# Patient Record
(demographics unavailable — no encounter records)

---

## 2024-10-14 NOTE — HISTORY OF PRESENT ILLNESS
[Patient reported bone density results were normal] : Patient reported bone density results were normal [Patient reported colonoscopy was normal] : Patient reported colonoscopy was normal [FreeTextEntry1] : 76-year-old postmenopausal presents for routine annual exam.  Patient underwent a procedure over the summer for endometrial polyp removal, she has not had any complications since or recurrence of symptoms patient has an appointment with Dr. Flaherty for her annual exam.  She usually gets her mammos request from Dr. Flaherty.  Patient states she has lost 27 pounds on Rybelsus so far.  Postmenopausal [Mammogramdate] : 1/2024 [TextBox_19] : BI-RADS 2 [BreastSonogramDate] : 1/2024 [TextBox_25] : BI-RADS 2 [PapSmeardate] : 1/2019 [TextBox_31] : NILM, HPV neg [BoneDensityDate] : 2014 [ColonoscopyDate] : 11/2013 [TextBox_43] : Needs follow-up

## 2024-10-14 NOTE — PHYSICAL EXAM
[Chaperone Present] : A chaperone was present in the examining room during all aspects of the physical examination [[id number]] : A chaperone was present during the pelvic exam. [Appropriately responsive] : appropriately responsive [Alert] : alert [No Acute Distress] : no acute distress [No Lymphadenopathy] : no lymphadenopathy [Soft] : soft [Non-tender] : non-tender [Non-distended] : non-distended [No HSM] : No HSM [No Lesions] : no lesions [No Mass] : no mass [Oriented x3] : oriented x3 [FreeTextEntry2] : Overweight [Examination Of The Breasts] : a normal appearance [No Masses] : no breast masses were palpable [Labia Majora] : normal [Labia Minora] : normal [Normal] : normal [Uterine Adnexae] : normal [FreeTextEntry5] : Pap today

## 2024-10-14 NOTE — PLAN
[FreeTextEntry1] :  Routine annual exam Breast and pelvic exam Pap smear today Referral for bone density Follow-up in 1 year or as needed Will call with Pap results

## 2024-10-25 NOTE — ASSESSMENT
[FreeTextEntry1] : dyspepsia/nausea - liekly related to rybelsus, but she is extremely concerned RE: panc ca and FHx .  Will get MRI. Trial ppi prn otc.  Colon cancer screening - colonoscopy due - Colonoscopy scheduled - Risks, benefits, alternatives were discussed, including but not limited to bleeding, infection, perforation and sedation risks. Additionally, the possibility of missed lesions was conveyed.  obesity - pt loss - hold rybelsus for procedure  -Constipation - reviewed dietary and lifestyle modifications, including increased fluid, fiber intake, as well as habituation / following urge to defecate, cutting back on bread/pasta, and to consider starting fiber supplement (eg.,  psyllium)  PMD/consultation/hospital notes and Labs/imaging/prior endoscopic results reviewed to extent noted in HPI; and, if procedure code billed on this visit for lab draw, this serves to signify that labs were drawn here in this office.

## 2024-10-25 NOTE — HISTORY OF PRESENT ILLNESS
[FreeTextEntry1] : 76f obesity, HLD< HTN, HITESH, mild AS, pericardial window '14, presenting for colon cancer screening. Pt denies abdominal pain, rectal bleeding, change in bowel habits, or unexplained weight loss.    Notes some nausea since starting rybelsus but is very concerned RE: panc ca as this mimics what her father had aat same age. GM w/ same.    + chronic constipation which she manages w/diet.  Last colonoscopy: 6 y ago w/ dr. Harrell - polyps - told 5y f/u 10/2024 cbc, cmet unrevealing  Soc:  no tobacco or significant EtOH FHx: F- panc ca  ROS: Constitutional:: no weight loss, fevers ENT: no deafness Eyes: not blind Neck: no LN Chest: no dyspnea/cough Cardiac: no chest pain Vascular: no leg swelling GI: no abdominal pain, nausea, vomiting, diarrhea, constipation, rectal bleeding, dysphagia, melena unless otherwise noted in HPI : no dysuria, dark urine Skin: no rashes, jaundice Heme: no bleeding Endocrine: no DM unless otherwise stated in HPI  Px: (VS noted below) General: NAD, obese Eyes: anicteric Oropharynx:  clear Neck: no LN Chest: normal respiratory effort CVS: regular Abd: soft, NT, ND, +BS, no HSM Ext: no atrophy Neuro: grossly nonfocal  Labs/imaging/prior endoscopic results reviewed to the extent available and noted in HPI

## 2024-10-25 NOTE — REASON FOR VISIT
[Consultation] : a consultation visit [FreeTextEntry1] : Kindly asked by DEVON SINGH MD  to consult and evaluate patient for     colon screening                A copy of this note is being sent to physician requesting consultation.

## 2024-11-07 NOTE — HISTORY OF PRESENT ILLNESS
[BoneDensityDate] : 10/24 [TextBox_37] : Dr drake [Mammogramdate] : 11/12/24 [FreeTextEntry1] : 01/24 [FreeTextEntry2] : dr chris- body check [FreeTextEntry3] : 01/12/24 [FreeTextEntry5] : 10/14

## 2025-03-03 NOTE — PHYSICAL EXAM
[Obese, well nourished, in no acute distress] : obese, well nourished, in no acute distress [de-identified] : TEB visit

## 2025-03-03 NOTE — ASSESSMENT
[FreeTextEntry1] : 77F PMH class III obesity, HTN, elevated trig, low HDL, HLD, prediabetes, HITESH (not tolerating CPAP), GERD, arthritis, asthma, uterine polyp, who presents to weight management for follow-up.  # Metabolic Syndrome (5 of 5 criteria- class III obesity - central, HTN, trigs, preDM, low HDL) c/b HLD, HITESH (not tolerating CPAP), GERD, arthritis: Weight today 250.5 lbs, BMI 45.82, BF 51.9%, down 2 lbs since last visit despite being off medication for a month, having gained 10 lbs, and lost it. However, she restarted at full 14 mg/d and has been experiencing nausea, she only ever really tolerated the Rybelsus with slow uptitration and stable dosing. I suspect she may have lost a bit more had she continued over that month, but may be close to her plateau with this treatment. We have discussed dietary adjustments and I am happy that she is seeing dietician this month as we still have some work to do in terms of planning her meals and to avoid binge-behavior and snacking, and reduce sugary intake. We have discussed the importance of EATING healthy food prior to falling into these eating patterns. Has HITESH, doesn't tolerate CPAP. Given her overall poor toleration of Rybelsus and insufficient weight loss, we will discontinue and start Zepbound  - Food log - Meal planning/prep - Discussed starting steel-cut oatmeal for breakfast, maybe sweetened with fruit - Prep lunches, or plan, discussed meal delivery service - Strategies for cooking for 1 (ie batches, etc) - Aim for 1 serving of vegetable per day.  - Reduce accessibility in house of sweets / snack food, increase availability of fresh fruit/veg - Start out day with solid food (ie not just hot chocolate, coffee etc), replace OJ w/ an orange - Dietician referral, re-establish - Restart basic physical activity - D/c Rybelsus (if not covered for Zepbound, likely dose qOd).  - Start Zepbound 2.5 mg/wk, prescribed to treat obstructive sleep apnea. - Will hold off adding topiramate for now, focus on diet, and other medical adjustments as above - F/u in 2-3 month     ...Z...papM papRy... met/top/cont

## 2025-03-03 NOTE — HISTORY OF PRESENT ILLNESS
[FreeTextEntry1] : 77F PMH class III obesity, HTN, elevated trig, low HDL, HLD, prediabetes, HITESH (not tolerating CPAP), GERD, arthritis, asthma, uterine polyp, who presents to weight management for follow-up.  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 She also noted stress eating habits and weight gain associated with stressful periods in her life. Estimating 10 lbs per year gain in recent years. Has engaged in numerous dietary interventions without sustained success. Diet noted for lots of processed foods, sugars, processed meats, desserts and snacks, eats at restaurants semi-frequently, and often getting market-buffet for home meals. Sedentary lifestyle. HITESH, untreated.  We discussed lifestyle and dietary interventions. She was prescribed Rybelsus.   Weight today: 250.5 lbs, BMI 45.82, BF 51.9% Weight at last visit (10/3/24): 252 lbs 4 oz, BMI 46.14, BF  Weight (8/1/24): 255 lbs, BMI 46.64, 51% Weight (6/6/24): 262 lbs 2 oz, BMI 47.94 Weight (4/18/24): 267 lbs, BMI 48.84 Weight at Initial Visit (1/22/24): 279 lbs, BMI 51.03, BF 49.5%.  She went to ED, dx flu and took tamiflu. Had colonoscopy / endoscopy, she held it for a week, she ended up stopping for a month, went up to 260 lbs, but since lost 10 lbs, restarted the 14 mg and has had some nausea.   Medication: As above, back on Rybelsus 14 mg/wk  Diet: Has appt with dietician next week. B: 4 chocolate Belvita crackers, milk, grapes L(2-3 pm): Milk and cookies  Snack: Getting hungry in afternoon. Lean cuisine, or milk and cookies  Exercise: Walking, 2000k steps per day. Hasn't started using stationary bike recently.  Sleep: Has hx HITESH, tried to wear CPAP for a year but couldn't tolerate.  (Interrupted to urinate frequently. Goes to bed ~11 pm, up at 7:30 am, estimates 7-8 hours, sometimes sleeps later. Does snore, has awoken herself

## 2025-04-23 NOTE — PHYSICAL EXAM
[Obese, well nourished, in no acute distress] : obese, well nourished, in no acute distress [de-identified] : TEB visit

## 2025-04-23 NOTE — PHYSICAL EXAM
[Obese, well nourished, in no acute distress] : obese, well nourished, in no acute distress [de-identified] : TEB visit

## 2025-04-23 NOTE — PHYSICAL EXAM
[Obese, well nourished, in no acute distress] : obese, well nourished, in no acute distress [de-identified] : TEB visit

## 2025-04-23 NOTE — ASSESSMENT
[FreeTextEntry1] : 77F PMH class III obesity, HTN, elevated trig, low HDL, HLD, prediabetes, HITESH (not tolerating CPAP), GERD, arthritis, asthma, uterine polyp, who presents to weight management for follow-up.  # Metabolic Syndrome (5 of 5 criteria- class III obesity - central, HTN, trigs, preDM, low HDL) c/b HLD, HITESH (not tolerating CPAP), GERD, arthritis: Weight today 257 lbs, BMI 47.03, regained some weight after stopping Rybelsus 14 mg/d switch to Zepbound 2.5 mg/wk, likely she will need to increase dose to see comparable effectiveness, but I still think Zepbound is likely to have overall higher efficacy if higher dose can be tolerated. Nausea experienced on Rybelsus is completely resolved. Does note more fatigue. She has some occasional light headedness that sounds orthostatic in nature and is intermittent, unclear that it is directly related to medication change, since she has not been losing weight I suspect it is not, but we will monitor and may switch back to Rybelsus if it continues.  - Food log - Meal planning/prep - Discussed starting steel-cut oatmeal for breakfast, maybe sweetened with fruit - Prep lunches, or plan, discussed meal delivery service - Strategies for cooking for 1 (ie batches, etc) - Aim for 1 serving of vegetable per day.  - Reduce accessibility in house of sweets / snack food, increase availability of fresh fruit/veg - Start out day with solid food (ie not just hot chocolate, coffee etc), replace OJ w/ an orange - Dietician referral, re-establish - Restart basic physical activity - Trial increase to Zepbound 5 mg, increase water intake, and will have low threshold to switch back to Rybelsus if light headedness worsens.  - F/u in 2-3 month    papRy... met/top/cont

## 2025-04-23 NOTE — HISTORY OF PRESENT ILLNESS
[FreeTextEntry1] : 77F PMH class III obesity, HTN, elevated trig, low HDL, HLD, prediabetes, HITESH (not tolerating CPAP), GERD, arthritis, asthma, uterine polyp, who presents to weight management for follow-up.  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 She also noted stress eating habits and weight gain associated with stressful periods in her life. Estimating 10 lbs per year gain in recent years. Has engaged in numerous dietary interventions without sustained success. Diet noted for lots of processed foods, sugars, processed meats, desserts and snacks, eats at restaurants semi-frequently, and often getting market-buffet for home meals. Sedentary lifestyle. HITESH, untreated.  We discussed lifestyle and dietary interventions. She was prescribed Rybelsus.   Weight today: 257 lbs, BMI 47.03 Weight at last visit (3/3/25): 250.5 lbs, BMI 45.82, BF 51.9% Weight (10/3/24): 252 lbs 4 oz, BMI 46.14, BF  Weight (8/1/24): 255 lbs, BMI 46.64, 51% Weight (6/6/24): 262 lbs 2 oz, BMI 47.94 Weight (4/18/24): 267 lbs, BMI 48.84 Weight at Initial Visit (1/22/24): 279 lbs, BMI 51.03, BF 49.5%.  Medication: Switched from Rybelsus 14 mg/wk to Zepbound 2.5 mg/wk, notes less nausea, almost none. Has noted dizziness, light headed, when moves head or getting up, feels generalized fatigued.  Diet: Works with dietician. B: 4 chocolate Belvita crackers, milk, grapes L(2-3 pm): Milk and cookies  Snack: Getting hungry in afternoon. Lean cuisine, or milk and cookies. Not drinking much water.   Exercise: Walking, 2000k steps per day. Hasn't started using stationary bike recently.  Sleep: Has hx HITESH, tried to wear CPAP for a year but couldn't tolerate.  (Interrupted to urinate frequently. Goes to bed ~11 pm, up at 7:30 am, estimates 7-8 hours, sometimes sleeps later. Does snore, has awoken herself

## 2025-05-15 NOTE — PHYSICAL EXAM
[Obese, well nourished, in no acute distress] : obese, well nourished, in no acute distress [de-identified] : TEB visit

## 2025-05-15 NOTE — ASSESSMENT
[FreeTextEntry1] : 77F PMH class III obesity, HTN, elevated trig, low HDL, HLD, prediabetes, HITESH (not tolerating CPAP), GERD, arthritis, asthma, uterine polyp, who presents to weight management for follow-up.  # Metabolic Syndrome (5 of 5 criteria- class III obesity - central, HTN, trigs, preDM, low HDL) c/b HLD, HITESH (not tolerating CPAP), GERD, arthritis: Weight today 256 lbs, BMI 46.82, BF 48.9%, down 1 lb since last visit on her 3 doses of 5 mg Zepbound, still up 6 lbs since switching from Rybelsus 14 mg/d, dosing remains low and she's having much less nausea than Rybelsus, light-headedness/fatigue have resolved. Encouraged to have vegetables and legumes and re-establish with dietician. Keep cookies etc out of house or at least in cabinet, replace at table with fresh fruit.  - Food log - Meal planning/prep - Discussed starting steel-cut oatmeal for breakfast, maybe sweetened with fruit - Prep lunches, or plan, discussed meal delivery service - Strategies for cooking for 1 (ie batches, etc) - Aim for 1 serving of vegetable per day.  - Reduce accessibility in house of sweets / snack food, increase availability of fresh fruit/veg - Start out day with solid food (ie not just hot chocolate, coffee etc), replace OJ w/ an orange - Dietician referral, re-establish - Restart basic physical activity - C/w Zepbound 5 mg for a total of 8 weeks, then increase to 7.5 mg/wk - F/u in 2 month    papRy... met/top/cont

## 2025-05-15 NOTE — HISTORY OF PRESENT ILLNESS
[FreeTextEntry1] : 77F PMH class III obesity, HTN, elevated trig, low HDL, HLD, prediabetes, HITESH (not tolerating CPAP), GERD, arthritis, asthma, uterine polyp, who presents to weight management for follow-up.  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 She also noted stress eating habits and weight gain associated with stressful periods in her life. Estimating 10 lbs per year gain in recent years. Has engaged in numerous dietary interventions without sustained success. Diet noted for lots of processed foods, sugars, processed meats, desserts and snacks, eats at restaurants semi-frequently, and often getting market-buffet for home meals. Sedentary lifestyle. HITESH, untreated.  We discussed lifestyle and dietary interventions. She was prescribed Rybelsus. She switched to Zepbound for HITESH in ~3/2025.  Weight today: 256 lbs, BMI 46.82, BF 48.9% Weight at last visit (4/21/25): 257 lbs, BMI 47.03 Weight (3/3/25): 250.5 lbs, BMI 45.82, BF 51.9% Weight (10/3/24): 252 lbs 4 oz, BMI 46.14, BF  Weight (8/1/24): 255 lbs, BMI 46.64, 51% Weight (6/6/24): 262 lbs 2 oz, BMI 47.94 Weight (4/18/24): 267 lbs, BMI 48.84 Weight at Initial Visit (1/22/24): 279 lbs, BMI 51.03, BF 49.5%.  Medication: She has been Zepbound 5 mg/wk x 3 doses, she has not had nausea as she did on Rybelsus.  Fatigue and light-headedness have improved.   Diet: Craving kraft mac and cheese a lot.  Vegetables - likes 'potatoes, sweet potatoes' carrots, spinach, saurkraut Wasn't able to connect with dietician.  Still cookies  Exercise: Knees have been hurting.  Walking has been a lot harder. Hasn't started using stationary bike recently.  Sleep:  (Has hx HITESH, tried to wear CPAP for a year but couldn't tolerate.  (Interrupted to urinate frequently. Goes to bed ~11 pm, up at 7:30 am, estimates 7-8 hours, sometimes sleeps later. Does snore, has awoken herself

## 2025-07-10 NOTE — PHYSICAL EXAM
[de-identified] : Physical exam shows a pleasant interactive female no acute distress appearing slightly younger than stated age very good historian.  She denies significant mechanical symptoms associated with her knees including buckling but does have severe pain that limits her activities and she cannot exercise as much as she would like especially given the situation she is in.  She has previously taken gabapentin in the past which has helped slightly with other pains.  Knee exam today shows trace to 1+ effusion on both sides as well as mild synovitis she has no instability in varus valgus or AP testing but does have some medial pseudolaxity that corrects to more normal typical valgus alignment with a solid endpoint.  There is some mild patellofemoral crepitus.  Full extension is present and flexion is limited to only about on 100 to 105 degrees in both knees.  She is neurovascularly intact distally. [de-identified] : Radiographic review 4 views of each knee demonstrates medial compartment bone-on-bone articulation especially seen on the Bradley view.  There is significant peripheral spur formation.  There is grade 3 changes in the patellofemoral as well as the lateral compartments as well.  There is some early tibial subluxation and some significant lateralization subluxation associated with the right greater than left tibia.

## 2025-07-10 NOTE — HISTORY OF PRESENT ILLNESS
[de-identified] : And is a patient today seen for bilateral knee osteoarthritic discomfort.  She has tried multiple conservative measures thus far but is left with residual pain and discomfort.  She has tried some conservative treatment options without adequate improvement including over-the-counter medications activity modifications.  She continues to have trouble with weight loss and still has a BMI of around 47.  She is prediabetic continues with other medical problems including's to porosis and some valvular heart disease.  She has not tried Celebrex thus far.

## 2025-07-10 NOTE — ASSESSMENT
[FreeTextEntry1] : Significant bilateral knee osteoarthritis with advanced arthritic or RF changes.  Discussed several treatment options with trying Celebrex of 200 mg and adding gabapentin as well as she has previously taken it.  Consider having her titrate up the dose of the gabapentin as necessary to try to manage discomfort and assist her with sleeping and if she can rest hopefully exercise more.  I talked about limited weightbearing exercise such as pool walking and water aerobics.  Also discussed other limited nonimpact exercises such as elliptical and recumbent as well as stationary bicycle.  If the Celebrex is ineffective certainly could switch to meloxicam.  Discussed assistance with weight loss as an option as well with a consultation potentially through the [facility] through Frida Parra.  We did further discuss a target weight for eventual knee replacement surgery with a BMI of around 40 based on her height of 5 foot 2 so that she under she understands what she needs to do as far as managing her weight.

## 2025-07-19 NOTE — ASSESSMENT
[FreeTextEntry1] : 77F PMH class III obesity, HTN, elevated trig, low HDL, HLD, prediabetes, HITESH (not tolerating CPAP), GERD, arthritis, asthma, uterine polyp, who presents to weight management for follow-up.  # Metabolic Syndrome (5 of 5 criteria- class III obesity - central, HTN, trigs, preDM, low HDL) c/b HLD, HITESH (not tolerating CPAP), GERD, arthritis: Weight today 251.4 lbs, BMI 45.98, BF 50.4%, down 5 lbs since last appointment 8 weeks ago, and 28 lbs since initial visit. Doing well on Zepbound 7.5 mg/wk, tolerating better. Working on dietary improvements, slowly improving. Saw ortho, bilateral bone-on-bone arthritis, needs to lose another 30-40 lbs for surgery. Hasn't started stationary bike or pool yet. Treating HITESH w/weight loss, didn't tolerate CPAP.  - Food log - Meal planning/prep - Discussed starting steel-cut oatmeal for breakfast, maybe sweetened with fruit - Prep lunches, or plan, discussed meal delivery service - Strategies for cooking for 1 (ie batches, etc) - Aim for 1 serving of vegetable per day.  - Reduce accessibility in house of sweets / snack food, increase availability of fresh fruit/veg - Start out day with solid food (ie not just hot chocolate, coffee etc), replace OJ w/ an orange - Dietician referral, re-establish - Restart basic physical activity - Increase Zepbound to 10 mg/wk - F/u in 2 month    papRy... met/top/cont

## 2025-07-19 NOTE — HISTORY OF PRESENT ILLNESS
[FreeTextEntry1] : 77F PMH class III obesity, HTN, elevated trig, low HDL, HLD, prediabetes, HITESH (not tolerating CPAP), GERD, arthritis, asthma, uterine polyp, who presents to weight management for follow-up.  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 She also noted stress eating habits and weight gain associated with stressful periods in her life. Estimating 10 lbs per year gain in recent years. Has engaged in numerous dietary interventions without sustained success. Diet noted for lots of processed foods, sugars, processed meats, desserts and snacks, eats at restaurants semi-frequently, and often getting market-buffet for home meals. Sedentary lifestyle. HITESH, untreated.  We discussed lifestyle and dietary interventions. She was prescribed Rybelsus. She switched to Zepbound for HITESH in ~3/2025.  Weight today: 251.4 lbs, BMI 45.98, BF 50.4% Weight at last visit (5/15/25): 256 lbs, BMI 46.82, BF 48.9% Weight (4/21/25): 257 lbs, BMI 47.03 Weight (3/3/25): 250.5 lbs, BMI 45.82, BF 51.9% Weight (10/3/24): 252 lbs 4 oz, BMI 46.14, BF  Weight (8/1/24): 255 lbs, BMI 46.64, 51% Weight (6/6/24): 262 lbs 2 oz, BMI 47.94 Weight (4/18/24): 267 lbs, BMI 48.84 Weight at Initial Visit (1/22/24): 279 lbs, BMI 51.03, BF 49.5%.  Medication: Has increased Zepbound from 5 mg/wk to 7.5 mg/wk x4 weeks, she has not had nausea as she did on Rybelsus. Fatigue a bit better.  Diet: Trying to have more fruit/veg, trying to get protein.  Trying to stay away from buffet, gets steak strips or chicken, vegetables. Sometimes some mashed potato. Still has some cookies but trying to keep less accessible.  Exercise: Saw Ortho about knees, both are bone-on-bone arthritis, needs to lose another 30-40 lbs for surgery. Started Celebrex. Hasn't started stationary bike or pool yet.  Sleep: Has hx HITESH, tried to wear CPAP for a year but couldn't tolerate.  (Interrupted to urinate frequently. Goes to bed ~11 pm, up at 7:30 am, estimates 7-8 hours, sometimes sleeps later. Does snore, has awoken herself

## 2025-07-19 NOTE — PHYSICAL EXAM
[Obese, well nourished, in no acute distress] : obese, well nourished, in no acute distress [de-identified] : TEB visit